# Patient Record
Sex: MALE | Race: ASIAN | NOT HISPANIC OR LATINO
[De-identification: names, ages, dates, MRNs, and addresses within clinical notes are randomized per-mention and may not be internally consistent; named-entity substitution may affect disease eponyms.]

---
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
## 2022-06-15 ENCOUNTER — EMERGENCY (EMERGENCY)
Facility: HOSPITAL | Age: 22
LOS: 1 days | Discharge: HOME | End: 2022-06-15
Attending: EMERGENCY MEDICINE | Admitting: EMERGENCY MEDICINE
Payer: MEDICAID

## 2022-06-15 VITALS
OXYGEN SATURATION: 98 % | HEIGHT: 66 IN | WEIGHT: 154.98 LBS | RESPIRATION RATE: 16 BRPM | TEMPERATURE: 98 F | HEART RATE: 79 BPM | DIASTOLIC BLOOD PRESSURE: 81 MMHG | SYSTOLIC BLOOD PRESSURE: 129 MMHG

## 2022-06-15 VITALS
DIASTOLIC BLOOD PRESSURE: 86 MMHG | OXYGEN SATURATION: 98 % | HEART RATE: 84 BPM | TEMPERATURE: 98 F | SYSTOLIC BLOOD PRESSURE: 116 MMHG | RESPIRATION RATE: 18 BRPM

## 2022-06-15 LAB
ALBUMIN SERPL ELPH-MCNC: 4.4 G/DL — SIGNIFICANT CHANGE UP (ref 3.3–5)
ALP SERPL-CCNC: 78 U/L — SIGNIFICANT CHANGE UP (ref 40–120)
ALT FLD-CCNC: 25 U/L — SIGNIFICANT CHANGE UP (ref 12–78)
ANION GAP SERPL CALC-SCNC: 6 MMOL/L — SIGNIFICANT CHANGE UP (ref 5–17)
APPEARANCE UR: CLEAR — SIGNIFICANT CHANGE UP
AST SERPL-CCNC: 19 U/L — SIGNIFICANT CHANGE UP (ref 15–37)
BASOPHILS # BLD AUTO: 0.05 K/UL — SIGNIFICANT CHANGE UP (ref 0–0.2)
BASOPHILS NFR BLD AUTO: 0.4 % — SIGNIFICANT CHANGE UP (ref 0–2)
BILIRUB SERPL-MCNC: 1.3 MG/DL — HIGH (ref 0.2–1.2)
BILIRUB UR-MCNC: NEGATIVE — SIGNIFICANT CHANGE UP
BUN SERPL-MCNC: 19 MG/DL — SIGNIFICANT CHANGE UP (ref 7–23)
CALCIUM SERPL-MCNC: 9.5 MG/DL — SIGNIFICANT CHANGE UP (ref 8.5–10.1)
CHLORIDE SERPL-SCNC: 108 MMOL/L — SIGNIFICANT CHANGE UP (ref 96–108)
CO2 SERPL-SCNC: 27 MMOL/L — SIGNIFICANT CHANGE UP (ref 22–31)
COLOR SPEC: YELLOW — SIGNIFICANT CHANGE UP
CREAT SERPL-MCNC: 1.1 MG/DL — SIGNIFICANT CHANGE UP (ref 0.5–1.3)
DIFF PNL FLD: NEGATIVE — SIGNIFICANT CHANGE UP
EGFR: 99 ML/MIN/1.73M2 — SIGNIFICANT CHANGE UP
EOSINOPHIL # BLD AUTO: 0.14 K/UL — SIGNIFICANT CHANGE UP (ref 0–0.5)
EOSINOPHIL NFR BLD AUTO: 1.2 % — SIGNIFICANT CHANGE UP (ref 0–6)
GLUCOSE SERPL-MCNC: 98 MG/DL — SIGNIFICANT CHANGE UP (ref 70–99)
GLUCOSE UR QL: NEGATIVE — SIGNIFICANT CHANGE UP
HCT VFR BLD CALC: 48.2 % — SIGNIFICANT CHANGE UP (ref 39–50)
HGB BLD-MCNC: 15.9 G/DL — SIGNIFICANT CHANGE UP (ref 13–17)
IMM GRANULOCYTES NFR BLD AUTO: 0.4 % — SIGNIFICANT CHANGE UP (ref 0–1.5)
KETONES UR-MCNC: ABNORMAL
LEUKOCYTE ESTERASE UR-ACNC: NEGATIVE — SIGNIFICANT CHANGE UP
LIDOCAIN IGE QN: 84 U/L — SIGNIFICANT CHANGE UP (ref 73–393)
LYMPHOCYTES # BLD AUTO: 1.05 K/UL — SIGNIFICANT CHANGE UP (ref 1–3.3)
LYMPHOCYTES # BLD AUTO: 9.3 % — LOW (ref 13–44)
MCHC RBC-ENTMCNC: 27.2 PG — SIGNIFICANT CHANGE UP (ref 27–34)
MCHC RBC-ENTMCNC: 33 GM/DL — SIGNIFICANT CHANGE UP (ref 32–36)
MCV RBC AUTO: 82.5 FL — SIGNIFICANT CHANGE UP (ref 80–100)
MONOCYTES # BLD AUTO: 0.62 K/UL — SIGNIFICANT CHANGE UP (ref 0–0.9)
MONOCYTES NFR BLD AUTO: 5.5 % — SIGNIFICANT CHANGE UP (ref 2–14)
NEUTROPHILS # BLD AUTO: 9.35 K/UL — HIGH (ref 1.8–7.4)
NEUTROPHILS NFR BLD AUTO: 83.2 % — HIGH (ref 43–77)
NITRITE UR-MCNC: NEGATIVE — SIGNIFICANT CHANGE UP
NRBC # BLD: 0 /100 WBCS — SIGNIFICANT CHANGE UP (ref 0–0)
PH UR: 5 — SIGNIFICANT CHANGE UP (ref 5–8)
PLATELET # BLD AUTO: 217 K/UL — SIGNIFICANT CHANGE UP (ref 150–400)
POTASSIUM SERPL-MCNC: 4.2 MMOL/L — SIGNIFICANT CHANGE UP (ref 3.5–5.3)
POTASSIUM SERPL-SCNC: 4.2 MMOL/L — SIGNIFICANT CHANGE UP (ref 3.5–5.3)
PROT SERPL-MCNC: 7.2 G/DL — SIGNIFICANT CHANGE UP (ref 6–8.3)
PROT UR-MCNC: NEGATIVE — SIGNIFICANT CHANGE UP
RBC # BLD: 5.84 M/UL — HIGH (ref 4.2–5.8)
RBC # FLD: 13.1 % — SIGNIFICANT CHANGE UP (ref 10.3–14.5)
SODIUM SERPL-SCNC: 141 MMOL/L — SIGNIFICANT CHANGE UP (ref 135–145)
SP GR SPEC: 1.01 — SIGNIFICANT CHANGE UP (ref 1.01–1.02)
UROBILINOGEN FLD QL: NEGATIVE — SIGNIFICANT CHANGE UP
WBC # BLD: 11.26 K/UL — HIGH (ref 3.8–10.5)
WBC # FLD AUTO: 11.26 K/UL — HIGH (ref 3.8–10.5)

## 2022-06-15 PROCEDURE — 74177 CT ABD & PELVIS W/CONTRAST: CPT | Mod: MA

## 2022-06-15 PROCEDURE — 87086 URINE CULTURE/COLONY COUNT: CPT

## 2022-06-15 PROCEDURE — 99284 EMERGENCY DEPT VISIT MOD MDM: CPT | Mod: 25

## 2022-06-15 PROCEDURE — 99285 EMERGENCY DEPT VISIT HI MDM: CPT

## 2022-06-15 PROCEDURE — 83690 ASSAY OF LIPASE: CPT

## 2022-06-15 PROCEDURE — 85025 COMPLETE CBC W/AUTO DIFF WBC: CPT

## 2022-06-15 PROCEDURE — 96360 HYDRATION IV INFUSION INIT: CPT | Mod: XU

## 2022-06-15 PROCEDURE — 81003 URINALYSIS AUTO W/O SCOPE: CPT

## 2022-06-15 PROCEDURE — 36415 COLL VENOUS BLD VENIPUNCTURE: CPT

## 2022-06-15 PROCEDURE — 74177 CT ABD & PELVIS W/CONTRAST: CPT | Mod: 26,MA

## 2022-06-15 PROCEDURE — 80053 COMPREHEN METABOLIC PANEL: CPT

## 2022-06-15 RX ORDER — SODIUM CHLORIDE 9 MG/ML
1000 INJECTION INTRAMUSCULAR; INTRAVENOUS; SUBCUTANEOUS ONCE
Refills: 0 | Status: COMPLETED | OUTPATIENT
Start: 2022-06-15 | End: 2022-06-15

## 2022-06-15 RX ADMIN — SODIUM CHLORIDE 1000 MILLILITER(S): 9 INJECTION INTRAMUSCULAR; INTRAVENOUS; SUBCUTANEOUS at 10:56

## 2022-06-15 RX ADMIN — SODIUM CHLORIDE 1000 MILLILITER(S): 9 INJECTION INTRAMUSCULAR; INTRAVENOUS; SUBCUTANEOUS at 11:56

## 2022-06-15 NOTE — ED PROVIDER NOTE - ATTENDING CONTRIBUTION TO CARE
21 male with constipation, now having abdominal pain, nausea, loose stool, f/u labs, ct abdomen/pelvis, iv fluids

## 2022-06-15 NOTE — ED PROVIDER NOTE - NSFOLLOWUPINSTRUCTIONS_ED_ALL_ED_FT
Colitis is a condition in which the colon is inflamed. It can cause diarrhea, blood in the stool, and abdominal pain. Colitis can last a short time (be acute), or it may last a long time (become chronic).      What are the causes?    This condition may be caused by:  •Infections from viruses or bacteria.      •A reaction to medicine.      •Certain autoimmune diseases, such as Crohn's disease or ulcerative colitis.      •Radiation treatment.      •Decreased blood flow to the bowel (ischemia).        What are the signs or symptoms?    Symptoms of this condition include:  •Diarrhea, blood in the stool, or black, tarry stool.      •Pain in the joints or abdominal pain.      •Fever or fatigue.      •Vomiting.      •Weight loss.      •Bloating.      •Having fewer bowel movements than usual.      •A strong and sudden urge to have a bowel movement.      •Feeling like the bowel is not empty after a bowel movement.        How is this diagnosed?    This condition may be diagnosed based on a stool test and a blood test. You may also have other tests, such as:  •X-rays.      •CT scan.      •Colonoscopy.      •Endoscopy.      •Biopsy.        How is this treated?    Treatment for this condition depends on the cause. This condition may be treated with:  •Steps to rest the bowel, such as not eating or drinking for a period of time.      •Fluids that are given through an IV.      •Medicine for pain and diarrhea.      •Antibiotic medicines.      •Cortisone medicines.      •Surgery.        Follow these instructions at home:      Eating and drinking      •Follow instructions from your health care provider about eating or drinking restrictions.      •Drink enough fluid to keep your urine pale yellow.      •Work with a dietitian to determine whether certain foods cause your condition to flare up.      •Avoid foods or drinks that cause flare-ups.      •Eat a well-balanced diet.      General instructions     •If you were prescribed an antibiotic medicine, take it as told by your health care provider. Do not stop taking the antibiotic even if you start to feel better.      •Take over-the-counter and prescription medicines only as told by your health care provider.      •Keep all follow-up visits. This is important.        Contact a health care provider if:    •Your symptoms do not go away.      •You develop new symptoms.        Get help right away if:    •You have a fever that does not go away with treatment.      •You develop chills.      •You have extreme weakness, fainting, or dehydration.      •You vomit repeatedly.      •You develop severe pain in your abdomen.      •You pass bloody or tarry stool.        Summary    •Colitis is a condition in which the colon is inflamed. Colitis can last a short time (be acute), or it may last a long time (become chronic).      •Treatment for this condition depends on the cause and may include resting the bowel, taking medicines, or having surgery.      •If you were prescribed an antibiotic medicine, take it as told by your health care provider. Do not stop taking the antibiotic even if you start to feel better.      •Get help right away if you develop severe pain in your abdomen.      •Keep all follow-up visits. This is important.      This information is not intended to replace advice given to you by your health care provider. Make sure you discuss any questions you have with your health care provider.        A full liquid diet refers to fluids and foods that are liquid, or will become liquid, at room temperature. This diet should only be used for a short period of time to help you recover from illness or surgery. Your health care provider or dietitian will help determine when it is safe to eat regular foods again.      What are tips for following this plan?    Reading food labels     •Check food labels of nutrition shakes for the amount of protein. Look for nutrition shakes that have at least 8–10 grams of protein in each serving.      •Choose drinks, such as milks and juices, that are "fortified" or "enriched." This means that vitamins and minerals have been added.      Shopping     •Buy pre-made nutrition shakes to keep on hand.      •To vary your choices, buy different flavors of milks and shakes.      Meal planning     •Choose flavors and foods that you enjoy.    •To make sure you get enough energy and calories from food:  •Have three full liquid meals each day. Have a liquid snack between each meal.      •Drink 6–8 oz (177–237 mL) of a nutritional supplement shake with meals or as snacks.      •Add protein powder, powdered milk, milk, or yogurt to shakes to increase the amount of protein.        •Drink at least one serving a day of citrus fruit juice or fruit juice that has vitamin C added.      General guidelines     •Before starting the full liquid diet, check with your health care provider to know what foods you should avoid. These may include full-fat or high-fiber liquids.      •You may have any liquid or food that becomes a liquid at room temperature. The food is considered a liquid if it can be poured off a spoon at room temperature.      • Do not drink alcohol unless approved by your health care provider.    •This diet gives you most of the nutrients that you need for energy, but you may not get enough of certain vitamins, minerals, and fiber. Make sure to talk to your health care provider or dietitian about:  •How many calories you need to eat each day.      •How much fluid you should have each day.      •Taking a multivitamin or a nutritional supplement.          What foods should I eat?     Fruits     Fruit juice without pulp. Strained fruit purées (seeds and skins removed).    Vegetables     Pulp-free tomato or vegetable juice. Vegetables puréed in soup.    Grains     Thin, hot cereal, such as farina. Soft-cooked pasta or rice puréed in soup.    Meats and other proteins     Beef, chicken, and fish broths. Powdered protein supplements.    Dairy     Milk and milk-based beverages, including milk shakes and instant breakfast mixes. Smooth yogurt. Puréed cottage cheese.    Fats and oils     Melted margarine and butter. Cream. Canola, almond, avocado, corn, grapeseed, sunflower, and sesame oils. Gravy.    Beverages     Water. Coffee and tea (caffeinated or decaffeinated). Cocoa. Liquid nutritional supplements. Soft drinks. Nondairy milks, such as almond, coconut, rice, or soy milk.    Sweets and desserts     Custard. Pudding. Flavored gelatin. Smooth ice cream (without nuts or candy pieces). Sherbet. Frozen ice pops. Italian ice. Pudding pops.    Seasonings and condiments     Salt and pepper. Spices. Vinegar. Ketchup. Yellow mustard. Smooth sauces, such as Hollandaise, cheese sauce, or white sauce. Soy sauce. Syrup. Honey. Jelly (without fruit pieces).    Other foods     Cocoa powder. Cream soups. Strained soups.    The items listed above may not be a complete list of foods and beverages you can eat. Contact a dietitian for more information.       What foods should I avoid?    Fruits     All whole fresh, frozen, or canned fruits.    Vegetables     All whole fresh, frozen, or canned vegetables.    Grains     Whole grains. Pasta. Rice. Cold cereal. Bread. Crackers.    Meats and other proteins     All cuts of meat, poultry, and fish. Eggs. Tofu and soy protein. Nuts and nut butters. Precooked or cured meat, such as sausages or meat loaves.    Dairy     Hard cheese. Yogurt with fruit chunks.    Fats and oils     Coconut oil. Palm oil. Lard. Cold butter.    Sweets and desserts     Ice cream or other frozen desserts that contain solids, such as nuts, chocolate chips, and pieces of cookies. Cakes. Cookies. Candy.    Seasonings and condiments     Stone-ground mustard.    Other foods     Soups with chunks or pieces.    The items listed above may not be a complete list of foods and beverages you should avoid. Contact a dietitian for more information.       Summary    •A full liquid diet refers to fluids and foods that are liquid or will become liquid at room temperature.      •This diet should only be used for a short period of time to help you recover from illness or surgery. Ask your health care provider or dietitian when it is safe for you to eat regular foods.      •To make sure you get enough calories and nutrients, eat three meals each day with snacks in between. Drink pre-made nutritional supplement shakes or add protein powder to homemade shakes. Talk to your health care provider about taking a vitamin and mineral supplement.      This information is not intended to replace advice given to you by your health care provider. Make sure you discuss any questions you have with your health care provider.      Document Revised: 10/05/2021 Document Reviewed: 10/05/2021    Elsevier Patient Education © 2022 [employer]

## 2022-06-15 NOTE — ED PROVIDER NOTE - NS ED ATTENDING STATEMENT MOD
I have seen and examined this patient and fully participated in the care of this patient as the teaching attending.  The service was shared with the ASHELY.  I reviewed and verified the documentation and independently performed the documented:

## 2022-06-15 NOTE — ED ADULT TRIAGE NOTE - CHIEF COMPLAINT QUOTE
c/o lower abdominal pain since last night with diarrhea- patient states stool is mixed with little blood

## 2022-06-15 NOTE — ED PROVIDER NOTE - PATIENT PORTAL LINK FT
You can access the FollowMyHealth Patient Portal offered by [facility] by registering at the following website: [email or web address]. By joining [facility]’s FollowMyHealth portal, you will also be able to view your health information using other applications (apps) compatible with our system.

## 2022-06-15 NOTE — ED ADULT NURSE NOTE - NSIMPLEMENTINTERV_GEN_ALL_ED
Implemented All Universal Safety Interventions:  [place] to call system. Call bell, personal items and telephone within reach. Instruct patient to call for assistance. Room bathroom lighting operational. Non-slip footwear when patient is off stretcher. Physically safe environment: no spills, clutter or unnecessary equipment. Stretcher in lowest position, wheels locked, appropriate side rails in place.

## 2022-06-15 NOTE — ED PROVIDER NOTE - OBJECTIVE STATEMENT
This is a 22 y/o M with no significant personal medical or surgical Hx except constipation presented to the ED c/o intermittent lower abdominal pain radiating to his LLQ described as stabbing pain lasting at least 10 sec each time but occurring every 10 mis at least.  States, pain is associated with diarrhea with small amount of mucoid blood.  Denies having same episode in the past.  Denies hemorrhoids.  Admits to have been seeing a GI (Dr. Orozco) who has placed him on laxative and anti-gas med (unable to recall names) but c/o of no improvement. Outpatient FOBT was negative at that time.  He recently has seen him 2 weeks ago and recommended a colonoscopy last Friday but did not go as he thought he was getting better.  Yesterday, he ate a subway sandwich at 6pm and ate pasta at 11pm.  States, his brother ate the same with no complaints.  Denies fever, chills, cough. SOB, pleuritic pain, N/V/bloating.  Denies sick contact.  Denies having family member with same complaints. This is a 20 y/o M with no significant personal medical or surgical Hx except constipation presented to the ED c/o intermittent lower abdominal pain radiating to his LLQ described as stabbing pain lasting at least 10 sec each time but occurring every 10 mis at least.  States, pain is associated with diarrhea with small amount of mucoid blood.  Denies having same episode in the past.  Denies hemorrhoids.  Admits to have been seeing a GI (Dr. Orozco) who has placed him on laxative and anti-gas med (unable to recall names) but c/o of no improvement. Outpatient FOBT was negative at that time.  He recently has seen him 2 weeks ago and recommended a colonoscopy last Friday but did not go as he thought he was getting better.  Yesterday, he ate a subway sandwich at 6pm and ate pasta at 11pm.  States, his brother ate the same with no complaints.  Denies fever, chills, cough. SOB, pleuritic pain, N/V/bloating.  Denies sick contact or recent travel.  Denies having family member with same complaints. This is a 20 y/o M with no significant personal medical or surgical Hx except constipation presented to the ED c/o intermittent lower abdominal pain radiating to his LLQ described as stabbing pain lasting at least 10 sec each time but occurring every 10 mis at least.  States, pain is associated with diarrhea with small amount of mucoid blood.  Denies having same episode in the past.  Denies hemorrhoids.  Admits to have been seeing a GI (Dr. Orozco) who has placed him on laxative and anti-gas med (unable to recall names) but c/o of no improvement. Outpatient FOBT was negative at that time.  He recently has seen him 2 weeks ago and recommended a colonoscopy last Friday but did not go as he thought he was getting better.  Yesterday, he ate a subway sandwich at 6pm and ate pasta at 11pm.  States, his brother ate the same with no complaints.  Denies fever, chills, cough. SOB, pleuritic pain, N/V/bloating, dysuria or hemmturia.  Denies sick contact or recent travel.  Denies having family member with same complaints.

## 2022-06-15 NOTE — ED PROVIDER NOTE - CARE PROVIDER_API CALL
[facility]  G[facility]enterology  [address]  [address]  [place] [address]  Phone: [phone]  Fax: [phone]  Follow Up Time:

## 2022-06-15 NOTE — ED ADULT NURSE NOTE - OBJECTIVE STATEMENT
Received pt in bed alert and oriented x4.  C/O abdominal pain Received pt in bed alert and oriented x4.  C/O abdominal pain since 1 am in lower umbilicus region.  Pt reports having diarrhea as well.  Denies n/v and fevers.  Pt states he ate some cheese which might have been bad with his pasta and felt sick shortly after.  denies chest pain, sob.

## 2022-06-15 NOTE — ED PROVIDER NOTE - PROGRESS NOTE DETAILS
labs, ct results discussed with patient, will f/u with own GIDr. King Patito Stephen, states he was due to have a colonscopy last friday but was cancelled, will call again to re-schedule, instrucetd to start, liquid diet, understands to return to ER for fever or worsening of symptoms

## 2022-06-16 LAB
CULTURE RESULTS: SIGNIFICANT CHANGE UP
SPECIMEN SOURCE: SIGNIFICANT CHANGE UP

## 2024-04-15 NOTE — ED ADULT NURSE NOTE - LATERALITY
Medicare Wellness Visit  Plan for Preventive Care    A good way for you to stay healthy is to use preventive care.  Medicare covers many services that can help you stay healthy.* The goal of these services is to find any health problems as quickly as possible. Finding problems early can help make them easier to treat.  Your personal plan below lists the services you may need and when they are due.      Health Maintenance Summary       Shingles Vaccine (1 of 2)  Never done    COVID-19 Vaccine (6 - 2023-24 season)  Ordered on 4/15/2024    Traditional Medicare- Medicare Wellness Visit (Yearly)  Due since 4/1/2024    DM/CKD Microalbumin (Yearly)  Ordered on 4/15/2024    Influenza Vaccine (Season Ended)  Next due on 9/1/2024    DM/CKD GFR (Yearly)  Ordered on 4/15/2024    Depression Screening (Yearly)  Next due on 4/15/2025    DTaP/Tdap/Td Vaccine (2 - Td or Tdap)  Next due on 7/21/2031    Pneumococcal Vaccine 65+   Completed    Meningococcal Vaccine   Aged Out    Hepatitis B Vaccine (For Physician/APC Discussion)   Aged Out    HPV Vaccine   Aged Out             Preventive Care for Women and Men    Heart Screenings (Cardiovascular):  Blood tests are used to check your cholesterol, lipid and triglyceride levels. High levels can increase your risk for heart disease and stroke. High levels can be treated with medications, diet and exercise. Lowering your levels can help keep your heart and blood vessels healthy.  Your provider will order these tests if they are needed.    An ultrasound is done to see if you have an abdominal aortic aneurysm (AAA).  This is an enlargement of one of the main blood vessels that delivers blood to the body.   In the [place], 9,000 deaths are caused by AAA.  You may not even know you have this problem and as many as 1 in 3 people will have a serious problem if it is not treated.  Early diagnosis allows for more effective treatment and cure.  If you have a family history of AAA or are a  male age 65-75 who has smoked, you are at higher risk of an AAA.  Your provider can order this test, if needed.    Colorectal Screening:  There are many tests that are used to check for cancer of your colon and rectum. You and your provider should discuss what test is best for you and when to have it done.  Options include:  Screening Colonoscopy: exam of the entire colon, seen through a flexible lighted tube.  Flexible Sigmoidoscopy: exam of the last third (sigmoid portion) of the colon and rectum, seen through a flexible lighted tube.  Cologuard DNA stool test: a sample of your stool is used to screen for cancer and unseen blood in your stool.  Fecal Occult Blood Test: a sample of your stool is studied to find any unseen blood    Flu Shot:  An immunization that helps to prevent influenza (the flu). You should get this every year. The best time to get the shot is in the fall.    Pneumococcal Shot:  Vaccines help prevent pneumococcal disease, which is any type of illness caused by Streptococcus pneumoniae bacteria. There are two kinds of pneumococcal vaccines available in the [place]:   Pneumococcal conjugate vaccines (PCV20 or Nvdpyha86®)  Pneumococcal polysaccharide vaccine (PPSV23 or Qinhgvlln21®)  For those who have never received any pneumococcal conjugate vaccine, CDC recommends PVC20 for adults 65 years or older and adults 19 through 64 years old with certain medical conditions or risk factors.   For those who have previously received PCV13, this should be followed by a dose of PPSV23.     Hepatitis B Shot:  An immunization that helps to protect people from getting Hepatitis B. Hepatitis B is a virus that spreads through contact with infected blood or body fluids. Many people with the virus do not have symptoms.  The virus can lead to serious problems, such as liver disease. Some people are at higher risk than others. Your doctor will tell you if you need this shot.     Diabetes Screening:  A test to  measure sugar (glucose) in your blood is called a fasting blood sugar. Fasting means you cannot have food or drink for at least 8 hours before the test. This test can detect diabetes long before you may notice symptoms.    Glaucoma Screening:  Glaucoma screening is performed by your eye doctor. The test measures the fluid pressure inside your eyes to determine if you have glaucoma.     Hepatitis C Screening:  A blood test to see if you have the hepatitis C virus.  Hepatitis C attacks the liver and is a major cause of chronic liver disease.  Medicare will cover a single screening for all adults born between 1945 & 1965, or high risk patients (people who have injected illegal drugs or people who have had blood transfusions).  High risk patients who continue to inject illegal drugs can be screened for Hepatitis C every year.    Smoking and Tobacco-Use Cessation Counseling:  Tobacco is the single greatest cause of disease and early death in our country today. Medication and counseling together can increase a person’s chance of quitting for good.   Medicare covers two quitting attempts per year, with four counseling sessions per attempt (eight sessions in a 12 month period)    Preventive Screening tests for Women    Screening Mammograms and Breast Exams:  An x-ray of your breasts to check for breast cancer before you or your doctor may be able to feel it.  If breast cancer is found early it can usually be treated with success.    Pelvic Exams and Pap Tests:  An exam to check for cervical and vaginal cancer. A Pap test is a lab test in which cells are taken from your cervix and sent to the lab to look for signs of cervical cancer. If cancer of the cervix is found early, chances for a cure are good. Testing can generally end at age 65, or if a woman has a hysterectomy for a benign condition. Your provider may recommend more frequent testing if certain abnormal results are found.    Bone Mass Measurements:  A painless x-ray  of your bone density to see if you are at risk for a broken bone. Bone density refers to the thickness of bones or how tightly the bone tissue is packed.    Preventive Screening tests for Men    Prostate Screening:  Should you have a prostate cancer test (PSA)?  It is up to you to decide if you want a prostate cancer test. Talk to your clinician to find out if the test is right for you.  Things for you to consider and talk about should include:  Benefits and harms of the test  Your family history  How your race/ethnicity may influence the test  If the test may impact other medical conditions you have  Your values on screenings and treatments    *Medicare pays for many preventive services to keep you healthy. For some of these services, you might have to pay a deductible, coinsurance, and / or copayment.  The amounts vary depending on the type of services you need and the kind of Medicare health plan you have.    For further details on screenings offered by Medicare please visit: https://www.medicare.gov/coverage/preventive-screening-services       Patient Instructions/Information    EXERCISE TIPS    To get that most out of your workouts, here are some exercise tips to keep in mind:    ~  If you haven't exercised for a number of years, contact your physician for a physical or exercise stress test.    ~  Begin exercising slowly.  Start at a lower intensity and duration and slowly work up to higher levels.    ~  Select activities you enjoy. Ask yourself some questions:                                       Do you like exercising alone?                                     Do you like exercising with a group or a friend?                                     Do you like outdoor or indoor activities?    ~  Choose a time of day that fits into your schedule.  Take into consideration work, children, and the weather.  If you are not a morning person, schedule it a lunchtime or in the evening -- no specific time of day is the  best.    ~  Exercise does not have to be expensive and you do not have to join a health club or buy equipment -- all you need is a good pair of walking shoes.    ~  Choose exercise shoes with proper support and balance to protect your knees and back.    ~  Don't believe the phrase \"no pain, no gain\".  Listen to your body.  Execise should not be painful.    ~  Make a strong commitment to a minimum of 8 weeks; your body may respond in two weeks, but it may take 8 weeks to notice the benefits and make exercise a part of your life.    ~  If you do not injoy the exercise you have chosen, be flexible and try a different activity, join a friend, or add variety.    ~  Set up an exercise schedule.  Make appointments with yourself to exercise.    ~  Have perseverance.  This is time just for you.    ~  Keep exercise records to assess your progress.    LOW CHOLESTEROL LOW FAT DIET GUIDELINES      For most people, blood cholesterol can be lowered by eating less saturated fat, less total fat and less cholesterol.  Saturated fat boosts your blood cholesterol level more than anything else in your diet.      Butter, cheese, milk, cream, ice cream, meat, poultry skin, coconut oil, cocoa butter, and palm oil all contain high amounts of saturated fat.  Limit these foods and increase your intake of fruits, vegetables, and grains according to the following chart.  How many portions and the size of each portion should be adjusted to reach and maintain your desirable weight.  As a guide, the recommended daily number of portions is listed for each food group.     BREADS, CEREALS, RICE & PASTA  Servings - 6 to 11 servings each day  Serving size = 1 slice bread, 1 cup ready-to-eat cereal or 1/2 cup cooked cereal, rice or pasta  Foods Recommended: breads (white, whole wheat, pumpernickel and rye), ayden, bagel, English muffin, sandwich buns, dinner rolls, rice cakes, low-fat crackers (matzo, bread sticks, rye krisp, saltines, zwieback), hot  cereals most cold dry cereals, pasta (plain noodles, spaghetti, macaroni), and grain rice  Go Easy On - store-bought pancakes, waffles, biscuits, muffins, cornbread  Decrease - croissants, butter rolls, Citizen of [place] pastry, doughnuts, sweet rolls, most snack crackers (cheese crackers, butter crackers, those made with saturated oils), granola-type cereals (made with saturated oils), pasta and rice (prepared with cream, butter or cheese sauces), egg noodles    FRUITS  Servings - 2 to 4 servings each day  Serving size = 1 medium or 1/2 cup canned fruit, 3/4 cup juice  Foods Recommended - all fresh, frozen, canned or dried fruits, fruit juices  Decrease - avocado    VEGETABLES  Servings - 3 to 5 servings each day  Serving Size = 1 cup raw leafy or 1/2 cup cooked  Foods Recommended - fresh, frozen, or canned vegetables    LEGUMES  Servings  Serving Size = 1/2 cup cooked or canned  Foods Recommended - dried peas and beans (split peas, black-eyed peas, chick peas, kidney beans, navy beans, lentils, soybeans), soybean curd (tofu)  Go Easy On - old-fashioned peanut butter  Decrease - hydrogenated peanut butter, dry peas and beans (prepared with fat or high-fat meat)    EGGS  Servings - no more than 3 egg yolks a week  Foods Recommended - egg whites, cholesterol-free egg substitutes  Go Easy On - none  Decrease - egg yolks    MEAT, POULTRY, FISH & SHELLFISH  Servings - up to 6 ounces a day  Serving Size = 2 - 3 ounces cooked  Foods Recommended - lean cuts of meat (with fat trimmed, prepared by baking, broiling, or roasting), beef (round, sirloin, carmelo, loin), lamb (leg, arm, loin, rib), pork tenderloin, leg, fresh shoulder, arm or picnic, veal (all trimmed cuts except ground), poultry (without skin), fish, shellfish  Decrease - \"Prime\" grade fatty cuts like beef (corned beef, brisket, regular ground, short ribs), pork (spareribs, blade roll, fresh), goose (domestic duck), organ meats, sausage, nguyen, regular luncheon meats,  frankfurters, caviar, meagan, deep fried meats, canned fish packed in oil    DAIRY PRODUCTS  Servings - 2 servings a day; 3 servings for women who are pregnant or breast feeding  Serving size = 1 cup milk or yogurt, 1 1/2 ounce cheese  Foods Recommended - Skim milk, 1% milk, low-fat buttermilk, low-fat evaporated or nonfat milk, low-fat yogurt, low-fat soft cheese (cottage, farmer, pot cheeses labeled less than 5 grams of fat an ounce)  Go Easy On - 2% milk, yogurt, part-skim ricotta, part-skim or imitation hard cheese, like part-skim mozzarella, \"light\" cream cheese, \"light\" sour cream  Decrease - whole milk (regular, evaporated condensed), cream (half & half, most non-dairy creamers, imitation milk products, whipped cream), custard style yogurt, whole-milk ricotta, [place], Brie, hard cheeses (American, mozzarella, feta, cheddar, mu[place], cream cheese, sour cream)    FATS AND OILS  Servings - limit fat choices to 3 to 5 servings each day  Foods Recommended - unsaturated vegetable oils, corn, olive, peanut, rapeseed (canola, safflower, sesame, soybean, margarine, or shortening made from unsaturated fats, liquid, tub, diet), no-fat or low-fat salad dressings and mayonnaise  Go Easy On - nuts and seeds, avocados and olives, stick margarine  Decrease - butter, coconut oil, palm oil, palm kernel oil, lard, nguyen fat, margarine or shortening (made from saturated fats listed above), regular dressings, mayonnaise    SWEETS AND SNACKS  Servings - avoid too many sweets  Foods Recommended - low-fat frozen desserts, sherbet, sorbet, Italian ice, frozen yogurt, popsicles, low-fat cakes (junior food cake), low-fat cookies (fig bars and gingersnaps), low-fat candy (jelly beans and hard candy), low-fat snacks (plain popcorn, pretzels), non-fat beverages like carbonated drinks, juices, tea, coffee  Go Easy On - frozen desserts (ice milk), homemade cakes, cookies, and pies (using unsaturated oils sparingly), fruit crisps and  cobblers  Decrease - high-fat desserts (ice cream and frozen tofu), high-fat cakes (most store bought pies, pound and frosted cakes), store bought cookies, chocolate candy bars, high-fat snacks (chips and buttered popcorn), high-fat beverages like frappes, milkshakes, floats and eggnog    This general guide is provided by the Nutritional Counseling Department at [facility].  If you have questions or would like more information, call [phone] and ask for the dietician.                    bilateral

## 2024-06-05 ENCOUNTER — EMERGENCY (EMERGENCY)
Facility: HOSPITAL | Age: 24
LOS: 1 days | Discharge: HOME | End: 2024-06-05
Attending: EMERGENCY MEDICINE | Admitting: EMERGENCY MEDICINE
Payer: MEDICAID

## 2024-06-05 VITALS
RESPIRATION RATE: 18 BRPM | SYSTOLIC BLOOD PRESSURE: 152 MMHG | DIASTOLIC BLOOD PRESSURE: 95 MMHG | WEIGHT: 173.06 LBS | TEMPERATURE: 98 F | OXYGEN SATURATION: 98 % | HEIGHT: 67 IN | HEART RATE: 89 BPM

## 2024-06-05 PROCEDURE — 99283 EMERGENCY DEPT VISIT LOW MDM: CPT

## 2024-06-05 PROCEDURE — 99282 EMERGENCY DEPT VISIT SF MDM: CPT

## 2024-06-05 NOTE — ED PROVIDER NOTE - OBJECTIVE STATEMENT
23-year-old male with no significant past medical history presents to the ED with complaints of mild redness to the left forearm after a known dog bit him prior to arrival.  Dog is up-to-date on immunizations.  Patient is up-to-date on all immunizations.  No break in the skin.

## 2024-06-05 NOTE — ED PROVIDER NOTE - CLINICAL SUMMARY MEDICAL DECISION MAKING FREE TEXT BOX
23-year-old male with dog bite, abrasion.  No break in skin.  DC home with outpatient follow-up.  Patient to return to the ED for any signs of infection.

## 2024-06-05 NOTE — ED ADULT NURSE NOTE - ED STAT RN HANDOFF DETAILS
Pt seen by ED attending in triage area, cleared for d/c w/ no treatment necessary. D/c instructions reviewed by Dr. Weinberg. Pt ambulatory, left ED in stable condition.

## 2024-06-05 NOTE — ED ADULT NURSE NOTE - OBJECTIVE STATEMENT
Pt arrived to ED reporting bite to L ant forearm by tenant's dog. No bleeding noted. Small raised/red area about 1x1cm noted w/ no skin breakage. Reports dog is in process of getting vaccines. Seen by provider and cleared for d/c.

## 2024-06-05 NOTE — ED PROVIDER NOTE - PATIENT PORTAL LINK FT
You can access the FollowMyHealth Patient Portal offered by [facility] by registering at the following website: [email or web address]. By joining [employer]’s FollowMyHealth portal, you will also be able to view your health information using other applications (apps) compatible with our system.

## 2024-06-05 NOTE — ED PROVIDER NOTE - NSFOLLOWUPINSTRUCTIONS_ED_ALL_ED_FT
Animal Bite, Adult  Animal bites can be mild or serious. Small bites from house pets normally are mild. Bites from cats, strays, or wild animals can be serious. If a stray or wild animal bites you, you need to get medical help right away. You may also need a shot to prevent rabies infection.    What increases the risk?  Being near pets you do not know.  Being near animals that are eating, sleeping, or caring for their babies.  Being outside where small, wild animals move freely.  What are the signs or symptoms?  Pain.  Bleeding.  Swelling.  Bruising.  How is this treated?  Treatment may include:  Cleaning your wound.  Rinsing out (flushing) your wound. This uses saline solution, which is made of salt and water.  Putting a bandage on your wound.  Closing your wound with stitches (sutures), staples, skin glue, or skin tape (adhesive strips).  Antibiotic medicine. You may be given pills, cream, gel, or fluid through an IV.  A tetanus shot.  Rabies treatment, if the animal could have rabies.  Surgery, if there is infection or damage that needs to be fixed.  Follow these instructions at home:  Medicines    Take or apply over-the-counter and prescription medicines only as told by your doctor.  If you were prescribed an antibiotic medicine, take or apply it as told by your doctor. Do not stop using it even if your wound gets better.  Wound care    Two stitched wounds. One is normal. The other is red with pus and infected.  Follow instructions from your doctor about how to take care of your wound. Make sure you:  Wash your hands with soap and water for at least 20 seconds before and after you change your bandage. If you cannot use soap and water, use hand .  Change your bandage.  Leave stitches or skin glue in place for at least 2 weeks.  Leave tape strips alone unless you are told to take them off. You may trim the edges of the tape strips if they curl up.  Check your wound every day for signs of infection. Check for:  More redness, swelling, or pain.  More fluid or blood.  Warmth.  Pus or a bad smell.  General instructions    Bag of ice on a towel on the skin.   Raise (elevate) the injured area above the level of your heart while you are sitting or lying down.  If told, put ice on the injured area. To do this:  Put ice in a plastic bag.  Place a towel between your skin and the bag.  Leave the ice on for 20 minutes, 2–3 times per day.  Take off the ice if your skin turns bright red. This is very important. If you cannot feel pain, heat, or cold, you have a greater risk of damage to the area.  Keep all follow-up visits.  Contact a doctor if:  You have more redness, swelling, or pain around your wound.  Your wound feels warm to the touch.  You have a fever or chills.  You have a general feeling of sickness (malaise).  You feel like you may vomit.  You vomit.  You have pain that does not get better.  Get help right away if:  You have a red streak going away from your wound.  You have any of these coming from your wound:  Non-clear fluid.  More blood.  Pus or a bad smell.  You have trouble moving your injured area.  You lose feeling (have numbness) or feel tingling anywhere on your body.  Summary  Animal bites can be mild or serious.  If a stray or wild animal bites you, you need to get medical help right away.  Your doctor will look at the wound and may ask about how the animal bite happened.  Treatment may include wound care, antibiotic medicine, a tetanus shot, and rabies treatment.  This information is not intended to replace advice given to you by your health care provider. Make sure you discuss any questions you have with your health care provider.    Document Revised: 12/23/2022 Document Reviewed: 12/23/2022  [employer] Patient Education © 2024 [employer]

## 2024-06-06 PROBLEM — Z78.9 OTHER SPECIFIED HEALTH STATUS: Chronic | Status: ACTIVE | Noted: 2022-06-15
